# Patient Record
Sex: MALE | Race: WHITE | NOT HISPANIC OR LATINO | Employment: OTHER | URBAN - METROPOLITAN AREA
[De-identification: names, ages, dates, MRNs, and addresses within clinical notes are randomized per-mention and may not be internally consistent; named-entity substitution may affect disease eponyms.]

---

## 2023-10-03 DIAGNOSIS — K21.9 LARYNGOPHARYNGEAL REFLUX (LPR): Primary | ICD-10-CM

## 2023-10-03 RX ORDER — FAMOTIDINE 20 MG/1
20 TABLET, FILM COATED ORAL NIGHTLY
Qty: 30 TABLET | Refills: 2 | Status: SHIPPED | OUTPATIENT
Start: 2023-10-03 | End: 2023-12-06

## 2023-10-18 PROBLEM — D23.30 OTHER BENIGN NEOPLASM OF SKIN OF UNSPECIFIED PART OF FACE: Status: ACTIVE | Noted: 2021-12-14

## 2023-10-18 PROBLEM — R22.0 LOCALIZED SWELLING, MASS AND LUMP, HEAD: Status: ACTIVE | Noted: 2023-10-18

## 2023-10-18 PROBLEM — D22.39 MELANOCYTIC NEVI OF OTHER PARTS OF FACE: Status: ACTIVE | Noted: 2021-12-14

## 2023-10-18 PROBLEM — L82.0 INFLAMED SEBORRHEIC KERATOSIS: Status: ACTIVE | Noted: 2021-12-14

## 2023-10-18 PROBLEM — R68.89 EXCESSIVE ORAL SECRETIONS: Status: ACTIVE | Noted: 2023-10-18

## 2023-10-18 PROBLEM — M17.11 ARTHRITIS OF KNEE, RIGHT: Status: ACTIVE | Noted: 2023-10-18

## 2023-10-18 PROBLEM — C44.319 BASAL CELL CARCINOMA OF SKIN OF OTHER PARTS OF FACE: Status: ACTIVE | Noted: 2021-12-14

## 2023-10-18 PROBLEM — R42 VERTIGO: Status: ACTIVE | Noted: 2023-10-18

## 2023-10-18 PROBLEM — K21.9 GERD (GASTROESOPHAGEAL REFLUX DISEASE): Status: ACTIVE | Noted: 2023-10-18

## 2023-10-18 PROBLEM — L82.1 OTHER SEBORRHEIC KERATOSIS: Status: ACTIVE | Noted: 2021-12-14

## 2023-10-18 RX ORDER — ASCORBIC ACID 500 MG
TABLET ORAL
COMMUNITY

## 2023-10-18 RX ORDER — FLUTICASONE PROPIONATE AND SALMETEROL 113; 14 UG/1; UG/1
1 POWDER, METERED RESPIRATORY (INHALATION) 2 TIMES DAILY
COMMUNITY
Start: 2023-08-25

## 2023-10-18 RX ORDER — PANTOPRAZOLE SODIUM 40 MG/1
1 TABLET, DELAYED RELEASE ORAL DAILY
COMMUNITY
Start: 2022-08-19

## 2023-10-18 RX ORDER — CLOPIDOGREL BISULFATE 75 MG/1
75 TABLET ORAL DAILY
COMMUNITY

## 2023-10-18 RX ORDER — ACETAMINOPHEN 500 MG
TABLET ORAL
COMMUNITY

## 2023-10-18 RX ORDER — MAG CARB/ALUMINUM HYDROX/ALGIN 237.5-254
10 SUSPENSION, ORAL (FINAL DOSE FORM) ORAL NIGHTLY
COMMUNITY
Start: 2022-07-05

## 2023-10-18 RX ORDER — ROSUVASTATIN CALCIUM 20 MG/1
20 TABLET, COATED ORAL DAILY
COMMUNITY

## 2023-10-18 RX ORDER — EZETIMIBE 10 MG/1
10 TABLET ORAL DAILY
COMMUNITY

## 2023-10-18 RX ORDER — OMEPRAZOLE 20 MG/1
20 CAPSULE, DELAYED RELEASE ORAL
COMMUNITY
Start: 2023-09-07

## 2023-10-18 RX ORDER — KETOROLAC TROMETHAMINE 5 MG/ML
1 SOLUTION OPHTHALMIC 2 TIMES DAILY
COMMUNITY
Start: 2022-12-06

## 2023-10-18 RX ORDER — AMLODIPINE BESYLATE 2.5 MG/1
2.5 TABLET ORAL DAILY
COMMUNITY

## 2023-10-18 RX ORDER — HYDROCORTISONE ACETATE, IODOQUINOL 19; 10 MG/G; MG/G
CREAM TOPICAL 2 TIMES DAILY
COMMUNITY
Start: 2021-12-15

## 2023-10-18 RX ORDER — PREDNISOLONE ACETATE 10 MG/ML
1 SUSPENSION/ DROPS OPHTHALMIC 2 TIMES DAILY
COMMUNITY
Start: 2022-12-06

## 2023-10-18 RX ORDER — COLCHICINE 0.6 MG/1
0.6 TABLET ORAL
COMMUNITY
Start: 2021-08-13

## 2023-10-18 RX ORDER — ROSUVASTATIN CALCIUM 40 MG/1
40 TABLET, COATED ORAL
COMMUNITY
Start: 2023-08-10

## 2023-10-19 ENCOUNTER — OFFICE VISIT (OUTPATIENT)
Dept: OTOLARYNGOLOGY | Facility: CLINIC | Age: 78
End: 2023-10-19
Payer: MEDICARE

## 2023-10-19 VITALS — WEIGHT: 178 LBS | BODY MASS INDEX: 26.29 KG/M2

## 2023-10-19 DIAGNOSIS — H69.92 DYSFUNCTION OF LEFT EUSTACHIAN TUBE: Primary | ICD-10-CM

## 2023-10-19 PROBLEM — H90.A32 MIXED CONDUCTIVE AND SENSORINEURAL HEARING LOSS OF LEFT EAR WITH RESTRICTED HEARING OF RIGHT EAR: Status: ACTIVE | Noted: 2023-10-19

## 2023-10-19 PROCEDURE — 1159F MED LIST DOCD IN RCRD: CPT | Performed by: GENERAL PRACTICE

## 2023-10-19 PROCEDURE — 99213 OFFICE O/P EST LOW 20 MIN: CPT | Performed by: GENERAL PRACTICE

## 2023-10-19 PROCEDURE — 1036F TOBACCO NON-USER: CPT | Performed by: GENERAL PRACTICE

## 2023-10-19 ASSESSMENT — PATIENT HEALTH QUESTIONNAIRE - PHQ9
2. FEELING DOWN, DEPRESSED OR HOPELESS: NOT AT ALL
1. LITTLE INTEREST OR PLEASURE IN DOING THINGS: NOT AT ALL
SUM OF ALL RESPONSES TO PHQ9 QUESTIONS 1 AND 2: 0

## 2023-10-19 NOTE — PROGRESS NOTES
Otolaryngology - Head and Neck Surgery Outpatient New Patient Visit Note         History Of Present Illness  Coy Beltran is a 78 y.o. male presenting for evaluation of  concerns for ETD and reflux as well as left incudopexy on exam.      Audiogram completed which shows downsloping primarily SNHL with mild CHL on the left.  Ongoing negative middle ear pressure on the left.      Pt reports improvement in glbous, pharyngeal mucous and reflux symptoms with medical maangement.  No obvious change in ear symptoms.      No otalgia, otorrhea, dizziness.         Recall  78yoM presents for evaluation of recent ear discomfort/fullness and mild dizziness.      reports symptoms noted after flight. dizziness persisted for 3d and then essentially resolved. mild intermittent imbalance remains, noted with rising in posture.    ear fullness L>R has persisted. pt reports baseline hearing loss, slowly progressive over years.      The patient reports slow progression of the hearing loss over time.  The paitent reports a history of intermittent, waxing/waning, nonpulsatile, tonal tinnitus which does not interfere with hearing.   The patient denies a history of significant noise exposure due to occupational exposures, industrial noise, etc.   The patient denies sudden changes in hearing.  The patient denies otalgia, otorrhea, or facial weakness.   The patient denies a history of otologic surgery or trauma.  The patient denies a history of blast injury, TBI or concussion associated with hearing loss.  The patient denies a family history of significant hearing loss.  The patient reports a history of AOM as a child, but no recent significant history of ear infection.        the pt reports a prior history of vertigo diagnosed as BPPV and resolved with epley. no recent RS vertigo.      reports a history of GERDfor which he uses Tums frequent.ly.       Past Medical History  He has no past medical history on file.    Surgical History  He has  no past surgical history on file.     Social History  He reports that he has never smoked. He has never used smokeless tobacco. No history on file for alcohol use and drug use.    Family History  No family history on file.     Allergies  Meperidine    Review of Systems  ROS: Pertinent positives as noted in HPI.    - CONSTITUTIONAL: Does not report weight loss, fever or chills.    - HEENT:   Ear: Does not report  , vertigo,    , otalgia, otorrhea  Nose: Does not report congestion, rhinorrhea, epistaxis, decreased smell  Throat: Does not report pain, dysphagia, odynophagia  Larynx: Does not report hoarseness,  difficulty breathing, pain with speaking (odynophonia)  Neck: Does not report new masses, pain, swelling  Face: Does not report sinus pain, pressure, swelling, numbness, weakness     - RESPIRATORY: Does not report SOB or cough.    - CV: Does not report palpitations or chest pain.     - GI: Does not report abdominal pain, nausea, vomiting or diarrhea.    - : Does not report dysuria or urinary frequency.    - MSK: Does not report myalgia or joint pain.    - SKIN: Does not report rash or pruritus.    - NEUROLOGICAL: Does not report headache or syncope.    - PSYCHIATRIC: Does not report recent changes in mood. Does not report anxiety or depression.         Physical Exam:     GENERAL: Alert & Oriented to person, place and time; Normal affect and appearance. Well developed and well nourished. Conversant & cooperative with examination.      HEAD: Normocephalic, atraumatic. No sinus tenderness to palpation. Normal parotid bilaterally. Normal facial strength.      NEUROLOGIC: Cranial nerves II-XII grossly intact, gait WNL. Normal mood and affect.     EYES: Extraocular movements intact. Pupils equal, round, reactive to light and accommodation. No nystagmus, no ptosis. no scleral injection.     EAR: Normal auricle. No discomfort or TTP with manipulation. Otoscopic exam showed normal external auditory canals bilaterally.  No purulence or EAC inflammation.   Right tympanic membrane clear and mobile without evidence of perforation, retraction or middle ear effusion. Left tympanic membrane clear with posterior/superior dimeric TM with local retraction and incudopexy without obvious significant erosion. without evidence of perforation, or middle ear effusion.  Unable to mobilize with valsalva.      NOSE: No external deformity. No external nasal lesions, lacerations, or scars. Nasal tip symmetrical with normal nasal valves. Nasal cavity with essentially midline septum, normal mucosa and turbinates. No lesions, masses, purulence or polyps.      OC/OP: Mucous membranes moist, no masses, lesions or exudates. Normal tongue, floor of mouth, teeth, gums, lips. Normal posterior pharyngeal wall. Normal tonsils without erythema, exudate or obvious calculi      NECK: No neck masses or thyroid enlargement. Trachea midline. No tenderness to palpation     LYMPHATIC: No cervical lymphadenopathy.      RESPIRATORY: Symmetric chest elevation & no retractions. No significant hoarseness. No increased work of breathing.     CV: No clubbing or cyanosis. No obvious edema     Skin: no facial rashes, vesicles or lesions.      Extremities: no gross abnormalities    Clinic Procedure        Information review:  External sources (notes, imaging, lab results) listed below personally reviewed to aid in medical decision making process.  - MRI report from 8/9/23 shows some evidence of sinusitis but no soft tissue mass.      -  -      Assessment/Plan   Problem List Items Addressed This Visit    None  Visit Diagnoses         Codes    Dysfunction of left eustachian tube    -  Primary H69.92          B/l SNHL as well as mild left sided CHL with ETD and incudopexy.  No evidence of otitis or cholesteatoma.       Discussed use of hearing aid for hearing loss.  Discussed consideration of treatment for ETD, possible medical management and or interventions such as PE tube or ET  balloon dilation.  Pt prefers to observe for now.  RTC in 3mo or sooner for concerns.        Follow up:  -plan for follow up in clinic as needed and in 2-4 months    All of the above findings, impressions, treatment planning and follow up plans were discussed with the patient who indicated understanding.  the patient was instructed to contact or return to clinic sooner if symptoms/signs persist or worsen despite the above management.      Musa Saleh MD  Otolaryngology - Head and Neck Surgery

## 2023-12-05 DIAGNOSIS — K21.9 LARYNGOPHARYNGEAL REFLUX (LPR): ICD-10-CM

## 2023-12-06 RX ORDER — FAMOTIDINE 20 MG/1
20 TABLET, FILM COATED ORAL NIGHTLY
Qty: 90 TABLET | Refills: 0 | Status: SHIPPED | OUTPATIENT
Start: 2023-12-06 | End: 2024-03-11

## 2024-03-11 DIAGNOSIS — K21.9 LARYNGOPHARYNGEAL REFLUX (LPR): ICD-10-CM

## 2024-03-11 RX ORDER — FAMOTIDINE 20 MG/1
20 TABLET, FILM COATED ORAL NIGHTLY
Qty: 90 TABLET | Refills: 0 | Status: SHIPPED | OUTPATIENT
Start: 2024-03-11 | End: 2024-06-05

## 2024-04-15 ENCOUNTER — OFFICE VISIT (OUTPATIENT)
Dept: OTOLARYNGOLOGY | Facility: CLINIC | Age: 79
End: 2024-04-15
Payer: MEDICARE

## 2024-04-15 VITALS — WEIGHT: 179 LBS | BODY MASS INDEX: 27.22 KG/M2

## 2024-04-15 DIAGNOSIS — H73.892 TYMPANIC MEMBRANE RETRACTION, LEFT: ICD-10-CM

## 2024-04-15 DIAGNOSIS — H90.A32 MIXED CONDUCTIVE AND SENSORINEURAL HEARING LOSS OF LEFT EAR WITH RESTRICTED HEARING OF RIGHT EAR: Primary | ICD-10-CM

## 2024-04-15 PROCEDURE — 99213 OFFICE O/P EST LOW 20 MIN: CPT | Performed by: GENERAL PRACTICE

## 2024-04-15 PROCEDURE — 1159F MED LIST DOCD IN RCRD: CPT | Performed by: GENERAL PRACTICE

## 2024-04-15 PROCEDURE — 1036F TOBACCO NON-USER: CPT | Performed by: GENERAL PRACTICE

## 2024-04-15 ASSESSMENT — PATIENT HEALTH QUESTIONNAIRE - PHQ9
1. LITTLE INTEREST OR PLEASURE IN DOING THINGS: NOT AT ALL
SUM OF ALL RESPONSES TO PHQ9 QUESTIONS 1 AND 2: 0
2. FEELING DOWN, DEPRESSED OR HOPELESS: NOT AT ALL

## 2024-04-15 NOTE — PROGRESS NOTES
Otolaryngology - Head and Neck Surgery Outpatient New Patient Visit Note        Assessment/Plan   Problem List Items Addressed This Visit             ICD-10-CM       ENT    Mixed conductive and sensorineural hearing loss of left ear with restricted hearing of right ear - Primary H90.A32     Other Visit Diagnoses         Codes    Tympanic membrane retraction, left     H73.892          Stable incudopexy on the left, able to clear ears with valsalva b/l, without otitis on exam.   Continue current management for rhinitis and reflux to prevent ETD PRN.  Follow up for surveillance q6mo or sooner for concerns.           Follow up:  -plan for follow up in clinic as needed and in approximately 6 months    All of the above findings, impressions, treatment planning and follow up plans were discussed with the patient who indicated understanding.  the patient was instructed to contact or return to clinic sooner if symptoms/signs persist or worsen despite the above management.      Musa Saleh MD  Otolaryngology - Head and Neck Surgery            History Of Present Illness  Coy Beltran is a 78 y.o. male presenting for follow up of ETD and rhinitis  No recent bothersome symptoms.   No otalgia, otorrhea.  Hearing remains baseline.       Recall    Coy Beltran is a 78 y.o. male presenting for evaluation of  concerns for ETD and reflux as well as left incudopexy on exam.       Audiogram completed which shows downsloping primarily SNHL with mild CHL on the left.  Ongoing negative middle ear pressure on the left.       Pt reports improvement in glbous, pharyngeal mucous and reflux symptoms with medical maangement.  No obvious change in ear symptoms.       No otalgia, otorrhea, dizziness.            Recall  78yoM presents for evaluation of recent ear discomfort/fullness and mild dizziness.      reports symptoms noted after flight. dizziness persisted for 3d and then essentially resolved. mild intermittent imbalance remains, noted  with rising in posture.    ear fullness L>R has persisted. pt reports baseline hearing loss, slowly progressive over years.      The patient reports slow progression of the hearing loss over time.  The paitent reports a history of intermittent, waxing/waning, nonpulsatile, tonal tinnitus which does not interfere with hearing.   The patient denies a history of significant noise exposure due to occupational exposures, industrial noise, etc.   The patient denies sudden changes in hearing.  The patient denies otalgia, otorrhea, or facial weakness.   The patient denies a history of otologic surgery or trauma.  The patient denies a history of blast injury, TBI or concussion associated with hearing loss.  The patient denies a family history of significant hearing loss.  The patient reports a history of AOM as a child, but no recent significant history of ear infection.        the pt reports a prior history of vertigo diagnosed as BPPV and resolved with epley. no recent RS vertigo.      reports a history of GERDfor which he uses Tums frequent.ly.             Past Medical History  He has no past medical history on file.    Surgical History  He has no past surgical history on file.     Social History  He reports that he has never smoked. He has never used smokeless tobacco. No history on file for alcohol use and drug use.    Family History  No family history on file.     Allergies  Meperidine    Review of Systems  ROS: Pertinent positives as noted in HPI.    - CONSTITUTIONAL: Does not report weight loss, fever or chills.    - HEENT:   Ear: Does not report  , vertigo,    , otalgia, otorrhea  Nose: Does not report  , rhinorrhea, epistaxis, decreased smell  Throat: Does not report pain, dysphagia, odynophagia  Larynx: Does not report hoarseness,  difficulty breathing, pain with speaking (odynophonia)  Neck: Does not report new masses, pain, swelling  Face: Does not report sinus pain, pressure, swelling, numbness, weakness     -  RESPIRATORY: Does not report SOB or cough.    - CV: Does not report palpitations or chest pain.     - GI: Does not report abdominal pain, nausea, vomiting or diarrhea.    - : Does not report dysuria or urinary frequency.    - MSK: Does not report myalgia or joint pain.    - SKIN: Does not report rash or pruritus.    - NEUROLOGICAL: Does not report headache or syncope.    - PSYCHIATRIC: Does not report recent changes in mood. Does not report anxiety or depression.         Physical Exam:     GENERAL:   Alert & Oriented to person, place and time; Normal affect and appearance. Well developed and well nourished. Conversant & cooperative with examination.     HEAD:   Normocephalic, atraumatic. No sinus tenderness to palpation. Normal parotid bilaterally. Normal facial strength.     NEUROLOGIC:   Cranial nerves II-XII grossly intact, gait WNL. Normal mood and affect.    EYES:   Extraocular movements intact. Pupils equal, round, reactive to light and accommodation. No nystagmus, no ptosis. no scleral injection.      EAR: Normal auricle. No discomfort or TTP with manipulation. Otoscopic exam showed normal external auditory canals bilaterally. No purulence or EAC inflammation.   Right tympanic membrane clear and mobile without evidence of perforation, retraction or middle ear effusion. Left tympanic membrane clear with posterior/superior dimeric TM with local retraction and incudopexy without obvious significant erosion. without evidence of perforation, or middle ear effusion.  Unable to mobilize with valsalva.  But feels ear pressure change b/l with valsalva.     NOSE:   No external deformity. No external nasal lesions, lacerations, or scars. Nasal tip symmetrical with normal nasal valves.   Nasal cavity with essentially midline septum, normal mucosa and turbinates. No lesions, masses, purulence or polyps.     OC/OP:   Mucous membranes moist, no masses, lesions or exudates.   Normal tongue, floor of mouth, teeth, gums,  lips. Normal posterior pharyngeal wall.    Normal tonsils without erythema, exudate or obvious calculi     NECK:   No neck masses or thyroid enlargement. Trachea midline. No tenderness to palpation    LYMPHATIC:   No cervical lymphadenopathy.     RESPIRATORY:   Symmetric chest elevation & no retractions. No significant hoarseness. No increased work of breathing.    CV:   No clubbing or cyanosis. No obvious edema    Skin:   No facial rashes, vesicles or lesions.     Extremities:   No gross abnormalities      Clinic Procedure        Information review:  External sources (notes, imaging, lab results) listed below personally reviewed to aid in medical decision making process.  -  -  -

## 2024-06-05 DIAGNOSIS — K21.9 LARYNGOPHARYNGEAL REFLUX (LPR): ICD-10-CM

## 2024-06-05 RX ORDER — FAMOTIDINE 20 MG/1
20 TABLET, FILM COATED ORAL NIGHTLY
Qty: 90 TABLET | Refills: 0 | Status: SHIPPED | OUTPATIENT
Start: 2024-06-05

## 2024-06-26 ENCOUNTER — OFFICE VISIT (OUTPATIENT)
Dept: DERMATOLOGY | Facility: CLINIC | Age: 79
End: 2024-06-26
Payer: MEDICARE

## 2024-07-01 ENCOUNTER — APPOINTMENT (OUTPATIENT)
Dept: DERMATOLOGY | Facility: CLINIC | Age: 79
End: 2024-07-01
Payer: MEDICARE

## 2024-07-22 ENCOUNTER — APPOINTMENT (OUTPATIENT)
Dept: DERMATOLOGY | Facility: CLINIC | Age: 79
End: 2024-07-22
Payer: MEDICARE

## 2024-08-31 DIAGNOSIS — K21.9 LARYNGOPHARYNGEAL REFLUX (LPR): ICD-10-CM

## 2024-09-03 RX ORDER — FAMOTIDINE 20 MG/1
20 TABLET, FILM COATED ORAL NIGHTLY
Qty: 90 TABLET | Refills: 0 | Status: SHIPPED | OUTPATIENT
Start: 2024-09-03

## 2024-10-15 ENCOUNTER — OFFICE VISIT (OUTPATIENT)
Dept: GASTROENTEROLOGY | Facility: HOSPITAL | Age: 79
End: 2024-10-15
Payer: MEDICARE

## 2024-10-15 VITALS — BODY MASS INDEX: 27.13 KG/M2 | HEIGHT: 68 IN | WEIGHT: 179 LBS

## 2024-10-15 DIAGNOSIS — K57.92 DIVERTICULITIS: Primary | ICD-10-CM

## 2024-10-15 DIAGNOSIS — K59.09 OTHER CONSTIPATION: ICD-10-CM

## 2024-10-15 DIAGNOSIS — K21.9 GASTROESOPHAGEAL REFLUX DISEASE, UNSPECIFIED WHETHER ESOPHAGITIS PRESENT: ICD-10-CM

## 2024-10-15 PROCEDURE — 99213 OFFICE O/P EST LOW 20 MIN: CPT

## 2024-10-15 PROCEDURE — 99203 OFFICE O/P NEW LOW 30 MIN: CPT

## 2024-10-15 PROCEDURE — 1159F MED LIST DOCD IN RCRD: CPT

## 2024-10-15 ASSESSMENT — ENCOUNTER SYMPTOMS
RECTAL PAIN: 0
ABDOMINAL DISTENTION: 0
COUGH: 0
FEVER: 0
ANAL BLEEDING: 0
TROUBLE SWALLOWING: 0
DIARRHEA: 0
CHILLS: 0
CONSTIPATION: 0
NAUSEA: 0
BLOOD IN STOOL: 0
FATIGUE: 0
VOMITING: 0
SHORTNESS OF BREATH: 0
ABDOMINAL PAIN: 0
APPETITE CHANGE: 0

## 2024-10-15 NOTE — PATIENT INSTRUCTIONS
I recommend taking 1/2 to 1 capful of Miralax daily in 8 oz of liquid.  This is to help move bowels and soften stool.  Continue daily fiber supplement  Contact me if symptoms reoccur  Continue pepcid as needed

## 2024-10-15 NOTE — ASSESSMENT & PLAN NOTE
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

## 2024-10-15 NOTE — PROGRESS NOTES
Subjective     History of Present Illness:   Coy Beltran is a 79 y.o. male with PMHx of aortic dilation, TIA on plavix, GERD, prostate cancer, HTN who presents to GI clinic for further evaluation of low abdominal pain.    Today, patient states in early September he has sharp pain radiating across low abdomen, worse on left side.  Went to [place].  CT scan showed mild descending diverticulitis.  Was treated with antibiotics. Has normal to soft stools.  Was ok for 2 weeks, but pain returned and in the past 3 days pain resolved. Never modified diet during this time. Takes daily fiber supplement. Normal moves bowels daily.  States stricture in bowel was investigated and due to scar tissue r/t hx prostate cancer.  Follows antireflux regimen.  Heartburn is a few times weekly.  Takes as needed 20 mg pepcid.  Prefers to manage without medication.  Denies constipation, diarrhea, dyspepsia, melena, hematochezia, dysphagia, unintentional weight loss    ETOH 1-2 drinks daily, denies smoking or marijuana  Denies fxh GI cancer or IBD  Abdominal Surgeries: denies    Last colonoscopy 2/2022 [facility]: diverticulosis, stricture sigmoid colon, 2 4 mm TA descending, 3 mm hyperplastic rectum  Last EGD 2017- gastritis and esophagitis      Past Medical History  As per HPI.     Social History  he  reports that he has never smoked. He has never used smokeless tobacco.     Family History  his family history is not on file.     Review of Systems  Review of Systems   Constitutional:  Negative for appetite change, chills, fatigue and fever.   HENT:  Negative for trouble swallowing.    Respiratory:  Negative for cough and shortness of breath.    Gastrointestinal:  Negative for abdominal distention, abdominal pain, anal bleeding, blood in stool, constipation, diarrhea, nausea, rectal pain and vomiting.       Allergies  Allergies   Allergen Reactions    Meperidine Unknown       Medications  Current Outpatient Medications   Medication Instructions     aluminum hydrox-magnesium carb (Gaviscon Extra Strength) 254-237.5 mg/5 mL suspension 10 mL, oral, Nightly    amLODIPine (NORVASC) 2.5 mg, oral, Daily    ascorbic acid (Vitamin C) 500 mg tablet oral    cholecalciferol (Vitamin D3) 50 mcg (2,000 unit) capsule oral    clopidogrel (PLAVIX) 75 mg, oral, Daily    colchicine 0.6 mg, oral    ezetimibe (ZETIA) 10 mg, oral, Daily    famotidine (PEPCID) 20 mg, oral, Nightly    fluticasone propion-salmeteroL (AirDuo RespiClick) 113-14 mcg/actuation inhaler 1 puff, inhalation, 2 times daily    hydrocortisone-iodoquinoL-aloe 1.9-1 % cream in packet 2 times daily,  apply to affected area twice daily as directed    ketorolac (Acular) 0.5 % ophthalmic solution 1 drop, Right Eye, 2 times daily, As directed    omeprazole (PRILOSEC) 20 mg, oral, TAKE 1 CAPSULE 30 MINUTES BEFORE AM/PM MEAL    pantoprazole (ProtoNix) 40 mg EC tablet 1 tablet, oral, Daily    prednisoLONE acetate (Pred-Forte) 1 % ophthalmic suspension 1 drop, Right Eye, 2 times daily    rosuvastatin (CRESTOR) 20 mg, oral, Daily    rosuvastatin (CRESTOR) 40 mg        Objective   There were no vitals taken for this visit.   Physical Exam  Constitutional:       Appearance: Normal appearance. He is normal weight.   HENT:      Mouth/Throat:      Mouth: Mucous membranes are dry.      Pharynx: Oropharynx is clear.   Cardiovascular:      Rate and Rhythm: Normal rate and regular rhythm.   Pulmonary:      Effort: Pulmonary effort is normal.      Breath sounds: Normal breath sounds. No wheezing or rhonchi.   Abdominal:      General: Abdomen is flat. Bowel sounds are normal. There is no distension.      Palpations: Abdomen is soft. There is no hepatomegaly.      Tenderness: There is no abdominal tenderness. There is no guarding or rebound. Negative signs include Bourgeois's sign.      Hernia: No hernia is present.   Musculoskeletal:         General: Normal range of motion.   Skin:     General: Skin is warm and dry.   Neurological:     "  General: No focal deficit present.      Mental Status: He is alert and oriented to person, place, and time.   Psychiatric:         Mood and Affect: Mood normal.         Behavior: Behavior normal.           No results found for: \"WBC\", \"HGB\", \"HCT\", \"PLT\"  No results found for: \"NA\", \"K\", \"CL\", \"CO2\", \"BUN\", \"CREATININE\", \"CALCIUM\", \"PROT\", \"BILITOT\", \"ALKPHOS\", \"ALT\", \"AST\", \"GLUCOSE\"        Coy Beltran is a 79 y.o. male who presents to GI clinic for diverticulitis.    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

## 2024-10-15 NOTE — ASSESSMENT & PLAN NOTE
First episode of mild diverticulitis, which clinically resolved following treatment  - patient prefers to avoid colonoscopy  - recommended patient contact me if symptoms reoccur

## 2025-02-03 ENCOUNTER — APPOINTMENT (OUTPATIENT)
Dept: OTOLARYNGOLOGY | Facility: CLINIC | Age: 80
End: 2025-02-03
Payer: MEDICARE

## 2025-02-03 VITALS — BODY MASS INDEX: 27.37 KG/M2 | WEIGHT: 180 LBS

## 2025-02-03 DIAGNOSIS — H61.23 EXCESSIVE CERUMEN IN BOTH EAR CANALS: ICD-10-CM

## 2025-02-03 DIAGNOSIS — K21.9 LARYNGOPHARYNGEAL REFLUX (LPR): ICD-10-CM

## 2025-02-03 DIAGNOSIS — R05.3 CHRONIC COUGH: Primary | ICD-10-CM

## 2025-02-03 DIAGNOSIS — H93.19 TINNITUS, UNSPECIFIED LATERALITY: ICD-10-CM

## 2025-02-03 PROCEDURE — 31231 NASAL ENDOSCOPY DX: CPT | Performed by: GENERAL PRACTICE

## 2025-02-03 PROCEDURE — 69210 REMOVE IMPACTED EAR WAX UNI: CPT | Performed by: GENERAL PRACTICE

## 2025-02-03 PROCEDURE — 1036F TOBACCO NON-USER: CPT | Performed by: GENERAL PRACTICE

## 2025-02-03 PROCEDURE — 99214 OFFICE O/P EST MOD 30 MIN: CPT | Performed by: GENERAL PRACTICE

## 2025-02-03 PROCEDURE — 1159F MED LIST DOCD IN RCRD: CPT | Performed by: GENERAL PRACTICE

## 2025-02-03 PROCEDURE — 31575 DIAGNOSTIC LARYNGOSCOPY: CPT | Performed by: GENERAL PRACTICE

## 2025-02-03 RX ORDER — MAGNESIUM CARB/ALUMINUM HYDROX 105-160MG
1 TABLET,CHEWABLE ORAL 3 TIMES DAILY
Qty: 90 TABLET | Refills: 3 | Status: SHIPPED | OUTPATIENT
Start: 2025-02-03

## 2025-02-04 NOTE — PROGRESS NOTES
Otolaryngology - Head and Neck Surgery Outpatient New Patient Visit Note        Assessment/Plan:   Problem List Items Addressed This Visit    None  Visit Diagnoses         Codes    Chronic cough    -  Primary R05.3    Tinnitus, unspecified laterality     H93.19    Relevant Orders    Referral to Audiology    Laryngopharyngeal reflux (LPR)     K21.9    Relevant Medications    aluminum hydrox-magnesium carb (Gaviscon Extra Strength) 160-105 mg tablet,chewable    Excessive cerumen in both ear canals     H61.23              79yoM with symptoms of LPR, with some LPR changes on NP Scope but no other laryngeal pathology.        Discussed conservative management of reflux, and risks of long term anti-reflux medications.  Discussed avoidance of triggers, dietary and behavioral management strategies for reflux.  Discussed possible referral to GI for further testing and evaluation.  Will trial   medical therapy (combination gaviscon and H2 blockers) for 1-2 months and assess for symptom response.  Plan for taper of medical management to least possible to control symptoms, in favor of using dietary/behavioral controls.       B/l cerumen impaction, debrided.   Reports recent increase in tinnitus, will acquire audio to aid in eval.  Discussed conservative tinnitus management.    Consider use of tinnitus management options, which include but are not limited to the following: sound therapy (use of pleasant or calming sounds that diminish the presence of tinnitus); mindfulness, meditation, and breathing exercises; sleep hygiene; mental health evaluation and formal therapies; psychiatric management of psychopharmaceuticals; dental/orthodontia evaluation; dietary changes (reduction of sodium, caffeine, alcohol); lifestyle changes (exercise, etc.); use of hearing protection and avoidance of loud noise; and formal tinnitus therapies (Tinnitus Retraining Therapy/ TRT, Progressive Tinnitus Management, Tinnitus Activities Treatment,  Cognitive Behavioral Therapy); and Biomedical Neuromodulation (Lenire).      Follow up:  -plan for follow up in clinic as needed, after completion of ordered studies, and in 1-2 months    All of the above findings, impressions, treatment planning and follow up plans were discussed with the patient who indicated understanding.  the patient was instructed to contact or return to clinic sooner if symptoms/signs persist or worsen despite the above management.      Musa Saleh MD  Otolaryngology - Head and Neck Surgery            History Of Present Illness  Coy Beltran is a 79 y.o. male presenting for bothersome globus, pharyngeal mucous and throat clearing/cough.  Reports symptoms present over months without inciting illness/trauma  Notes no significant ongoing rhinitis or sinusitis.   Notes a history of GERD, treated with famotidine and tums. Can't take PPI due to medicine interactions.     Reports recent increase in muffled hearing and tinnitus.  No otalgia.   Has hearing aids but typically does not wear.     Recall    Coy Beltran is a 78 y.o. male presenting for follow up of ETD and rhinitis  No recent bothersome symptoms.   No otalgia, otorrhea.  Hearing remains baseline.         Recall     Coy Beltran is a 78 y.o. male presenting for evaluation of  concerns for ETD and reflux as well as left incudopexy on exam.       Audiogram completed which shows downsloping primarily SNHL with mild CHL on the left.  Ongoing negative middle ear pressure on the left.       Pt reports improvement in glbous, pharyngeal mucous and reflux symptoms with medical maangement.  No obvious change in ear symptoms.       No otalgia, otorrhea, dizziness.            Recall  78yoM presents for evaluation of recent ear discomfort/fullness and mild dizziness.      reports symptoms noted after flight. dizziness persisted for 3d and then essentially resolved. mild intermittent imbalance remains, noted with rising in posture.    ear fullness  L>R has persisted. pt reports baseline hearing loss, slowly progressive over years.      The patient reports slow progression of the hearing loss over time.  The paitent reports a history of intermittent, waxing/waning, nonpulsatile, tonal tinnitus which does not interfere with hearing.   The patient denies a history of significant noise exposure due to occupational exposures, industrial noise, etc.   The patient denies sudden changes in hearing.  The patient denies otalgia, otorrhea, or facial weakness.   The patient denies a history of otologic surgery or trauma.  The patient denies a history of blast injury, TBI or concussion associated with hearing loss.  The patient denies a family history of significant hearing loss.  The patient reports a history of AOM as a child, but no recent significant history of ear infection.        the pt reports a prior history of vertigo diagnosed as BPPV and resolved with epley. no recent RS vertigo.      reports a history of GERDfor which he uses Tums frequent.ly.             Past Medical History  He has no past medical history on file.    Surgical History  He has no past surgical history on file.     Social History  He reports that he has never smoked. He has never used smokeless tobacco. No history on file for alcohol use and drug use.    Family History  No family history on file.     Allergies  Meperidine    Review of Systems  ROS: Pertinent positives as noted in HPI.    - CONSTITUTIONAL: Does not report weight loss, fever or chills.    - HEENT:   Ear: Does not report  , vertigo,    , otalgia, otorrhea  Nose: Does not report congestion, rhinorrhea, epistaxis, decreased smell  Throat: Does not report pain, dysphagia, odynophagia  Larynx: Does not report hoarseness,  difficulty breathing, pain with speaking (odynophonia)  Neck: Does not report new masses, pain, swelling  Face: Does not report sinus pain, pressure, swelling, numbness, weakness     - RESPIRATORY: Does not report  SOB or  .    - CV: Does not report palpitations or chest pain.     - GI: Does not report abdominal pain, nausea, vomiting or diarrhea.    - : Does not report dysuria or urinary frequency.    - MSK: Does not report myalgia or joint pain.    - SKIN: Does not report rash or pruritus.    - NEUROLOGICAL: Does not report headache or syncope.    - PSYCHIATRIC: Does not report recent changes in mood. Does not report anxiety or depression.         Physical Exam:     GENERAL:   Alert & Oriented to person, place and time; Normal affect and appearance. Well developed and well nourished. Conversant & cooperative with examination.     HEAD:   Normocephalic, atraumatic. No sinus tenderness to palpation. Normal parotid bilaterally. Normal facial strength.     NEUROLOGIC:   Cranial nerves II-XII grossly intact, gait WNL. Normal mood and affect.    EYES:   Extraocular movements intact. Pupils equal, round, reactive to light and accommodation. No nystagmus, no ptosis. no scleral injection.    EAR:   Normal auricle. No discomfort or TTP with manipulation.   Handheld otoscopic exam showed normal external auditory canals bilaterally. No purulence or EAC inflammation. Impacted cerumen b/l debrided with suction.   Right tympanic membrane clear and mobile without evidence of perforation, retraction or middle ear effusion.   Left tympanic membrane clear and mobile without evidence of perforation, retraction or middle ear effusion.     NOSE:   No external deformity. No external nasal lesions, lacerations, or scars. Nasal tip symmetrical with normal nasal valves.   Nasal cavity with essentially midline septum, normal mucosa and turbinates. No lesions, masses, purulence or polyps.     OC/OP:   Mucous membranes moist, no masses, lesions or exudates.   Normal tongue, floor of mouth, teeth, gums, lips. Normal posterior pharyngeal wall.    Normal tonsils without erythema, exudate or obvious calculi     NECK:   No neck masses or thyroid  enlargement. Trachea midline. No tenderness to palpation    LYMPHATIC:   No cervical lymphadenopathy.     RESPIRATORY:   Symmetric chest elevation & no retractions. No significant hoarseness. No increased work of breathing.    CV:   No clubbing or cyanosis. No obvious edema    Skin:   No facial rashes, vesicles or lesions.     Extremities:   No gross abnormalities      Clinic Procedure    Nasal Endoscopy Laryngoscopy Nasophrayngosocopy   Procedure: flexible fiberoptic laryngoscopy, nasopharyngoscopy.   Flexible Fiberoptic Laryngoscopy Indication:   inability to tolerate mirror exam     Risks, benefits, and alternatives, infection risk, bleeding risk and risk of mucosal trauma and pain were discussed with the patient. Verbal consent was obtained prior to the procedure and is detailed in the patient's record.     Procedure Note:      With the patient seated in the exam chair, the bilateral nasal cavity was topically treated with 4% lidocaine and phenylephrine.  The bilateral nasal cavity was intubated with the flexible laryngoscope.   Nasal Endoscopy: Nasal endoscopy was successfully completed using the endoscope and the nasal mucosa, septum, turbinates, and osteomeatal complex were examined.  Nasopharyngoscopy: Nasopharyngoscopy was successfully completed using the endoscope and the nasal mucosa, septum, turbinates, osteomeatal complex, and nasopharynx were examined.   Laryngoscopy: Laryngoscopy was successfully completed using the flexible laryngoscope and the nasopharynx, hypopharynx, and larynx were examined.  Patient Status: the patient tolerated the procedure well.   Complications: there were no complications.      . After obtaining adequate decongestion and anesthesia, the scope was introduced into the floor of the nasal cavity. The septum, inferior, and middle turbinates were without any mucosal lesions.  The [place] of [place] were clear bilaterally, and good velopharyngeal closure was obtained on  phonation.  Base of tongue, tonsillar complex, and posterior pharyngeal wall free of asymmetry or concerning ulcerations or masses.  supraglottic segments with edema, pachydermia and erythema at the esophageal opening and posterior supraglottis.  scattered mucous debris.  No mucosal ulcerations or masses.  True vocal cords abduct and adduct normally with no mucosal or mass lesions.  No masses visualized in the pyriform recesses.  The scope was removed and patient tolerated the procedure well.      Information review:  External sources (notes, imaging, lab results) listed below personally reviewed to aid in medical decision making process.  -  -  -

## 2025-02-10 ENCOUNTER — CLINICAL SUPPORT (OUTPATIENT)
Dept: AUDIOLOGY | Facility: CLINIC | Age: 80
End: 2025-02-10
Payer: MEDICARE

## 2025-02-10 DIAGNOSIS — H90.3 BILATERAL SENSORINEURAL HEARING LOSS: ICD-10-CM

## 2025-02-10 DIAGNOSIS — H93.13 TINNITUS OF BOTH EARS: Primary | ICD-10-CM

## 2025-02-10 PROCEDURE — 92550 TYMPANOMETRY & REFLEX THRESH: CPT | Mod: 52

## 2025-02-10 PROCEDURE — 92557 COMPREHENSIVE HEARING TEST: CPT

## 2025-02-10 ASSESSMENT — PAIN SCALES - GENERAL: PAINLEVEL_OUTOF10: 0 - NO PAIN

## 2025-02-10 ASSESSMENT — PAIN - FUNCTIONAL ASSESSMENT: PAIN_FUNCTIONAL_ASSESSMENT: 0-10

## 2025-02-10 NOTE — PROGRESS NOTES
AUDIOLOGIC EVALUATION      Name:  Coy Beltran  :  1945  Age:  79 y.o.  Date of Evaluation:  2/10/2025    Time: 830-930    HISTORY:  Coy Beltran, 79 y.o., was seen for an audiologic assessment at the request of Dr. Musa Saleh. He reported constant bilateral ringing tinnitus that began a few weeks ago. He noted he finds it bothersome and keeps him from falling asleep/wakes him up. He has a history of bilateral sensorineural hearing loss. He wears Signia hearing aids that were purchased from [employer]. He noted limited use of his hearing aids. He has a history of vertigo that occurred two years ago and was treated at [facility]. He has right otalgia over the last 5-6 months. He has a history of excessive occupational noise exposure. He denied otorrhea, aural pressure/fullness, history of otologic surgeries, and recent falls.       RESULTS:  Otoscopic Evaluation:  Right Ear: Unremarkable  Left Ear: Unremarkable    Immittance Measures:  Right Ear: Type A -- indicating normal volume, pressure, and static compliance  Left Ear: Type C -- indicating negative pressure    Acoustic Reflexes:  Right Ear: (Ipsilateral) Responses absent at 500-4000 Hz.  Left Ear: (Ipsilateral) Responses absent at 500-4000 Hz.    Pure Tone Audiometry:    Right Ear: Hearing within normal limits 125-250 Hz sloping to a moderately-severe sensorineural hearing loss at 8000 Hz    Left Ear: Mild sensorineural hearing loss 125-250 Hz sloping to a severe sensorineural hearing loss at 8000 Hz    Asymmetry: No    In comparison to previous audio completed on 23, his hearing has remained stable in both ears.      Reliability:   Good    Speech Audiometry:   Right: Speech Reception Threshold (SRT) was obtained at 40 dBHL.   SRT/PTA in Good agreement with pure tone average.    Left: Speech Reception Threshold (SRT) was obtained at 45 dBHL.   SRT/PTA in Good agreement with pure tone average.    Word Recognition Scores  (WRS):  Right Ear: excellent (100%) in quiet when words were presented at 80 dBHL.   Left Ear: excellent (100%) in quiet when words were presented at 85 dBHL.     Asymmetry: No      IMPRESSIONS:  Today's testing revealed normal ear canal volume, middle ear pressure, and tympanic membrane compliance in the right ear. In the left ear he has negative middle ear pressure. He has hearing within normal limits 125-250 Hz sloping to a moderately-severe sensorineural hearing loss at 8000 Hz in the right ear. In the left ear he has a mild sensorineural hearing loss 125-250 Hz sloping to a severe sensorineural hearing loss at 8000 Hz. In comparison to previous audio completed on 7/8/23, his hearing has remained stable in both ears. He has excellent word recognition in both ears. The results were discussed with the patient. We reviewed common causes and treatment of tinnitus. I encouraged him to increase overall wear time of his devices. He has interest in the Lenire device. As we don't dispense Lenire at , he was encouraged to schedule an evaluation with [facility], who can be reached at [phone].       RECOMMENDATIONS:  1.) Continue medical follow up with Ears Nose and Throat (ENT) provider as recommended.  2.) Return for audiologic evaluation  annually  to monitor hearing sensitivity and assess middle ear status or sooner should concerns arise. The audiology department can be reached at [phone] to schedule an appointment.   3.) Strive for full-time device use during waking hours, except when activities preclude device safety.  4.) Consider use of tinnitus management options, which include but are not limited to the following: sound therapy (use of pleasant or calming sounds that diminish the presence of tinnitus); mindfulness, meditation, and breathing exercises; sleep hygiene; mental health evaluation and formal therapies; psychiatric management of psychopharmaceuticals; dental/orthodontia  evaluation; dietary changes (reduction of sodium, caffeine, alcohol); lifestyle changes (exercise, etc.); use of hearing protection and avoidance of loud noise; and formal tinnitus therapies (Tinnitus Retraining Therapy/ TRT, Progressive Tinnitus Management, Tinnitus Activities Treatment, Cognitive Behavioral Therapy).  5.) For tinnitus management, consider Lenire treatment device, available at [facility] with Shayla Enamorado, JERO-EFREN, KD, or other audiologist provider. [facility] can be reached at [phone] for scheduling. [address], [address], [place] [id number].      Shayla Ivan, CCC-A  Clinical Audiologist      KEY  SNHL Sensorineural Hearing Loss   CHL Conductive Hearing Loss   MHL Mixed Hearing Loss   SSNHL Sudden Sensorineural Hearing Loss   WNL Within Normal Limits   PTA Pure Tone Average   TM Tympanic Membrane   ECV Ear Canal Volume   SRT Speech Reception Threshold   WRS Word Recognition Score

## 2025-02-13 ENCOUNTER — TELEPHONE (OUTPATIENT)
Dept: OTOLARYNGOLOGY | Facility: CLINIC | Age: 80
End: 2025-02-13
Payer: MEDICARE

## 2025-07-21 ENCOUNTER — OFFICE VISIT (OUTPATIENT)
Dept: OTOLARYNGOLOGY | Facility: CLINIC | Age: 80
End: 2025-07-21
Payer: MEDICARE

## 2025-07-21 VITALS — WEIGHT: 181.9 LBS | BODY MASS INDEX: 27.66 KG/M2

## 2025-07-21 DIAGNOSIS — H93.19 TINNITUS, UNSPECIFIED LATERALITY: Primary | ICD-10-CM

## 2025-07-21 DIAGNOSIS — J31.0 CHRONIC RHINITIS: ICD-10-CM

## 2025-07-21 PROCEDURE — 99214 OFFICE O/P EST MOD 30 MIN: CPT | Performed by: GENERAL PRACTICE

## 2025-07-21 PROCEDURE — 1159F MED LIST DOCD IN RCRD: CPT | Performed by: GENERAL PRACTICE

## 2025-07-21 RX ORDER — TRIAMCINOLONE ACETONIDE 55 UG/1
2 SPRAY, METERED NASAL DAILY
Qty: 16.5 G | Refills: 3 | Status: SHIPPED | OUTPATIENT
Start: 2025-07-21 | End: 2026-07-21

## 2025-07-21 ASSESSMENT — PATIENT HEALTH QUESTIONNAIRE - PHQ9
1. LITTLE INTEREST OR PLEASURE IN DOING THINGS: NOT AT ALL
2. FEELING DOWN, DEPRESSED OR HOPELESS: NOT AT ALL
SUM OF ALL RESPONSES TO PHQ9 QUESTIONS 1 AND 2: 0

## 2025-07-21 NOTE — PROGRESS NOTES
Otolaryngology - Head and Neck Surgery Outpatient Established Patient Visit Note        Assessment/Plan:   Problem List Items Addressed This Visit    None  Visit Diagnoses         Codes      Tinnitus, unspecified laterality    -  Primary H93.19    Relevant Orders    Referral to Psychology      Chronic rhinitis     J31.0    Relevant Medications    triamcinolone (Nasacort) 55 mcg nasal inhaler            80yoM with ongoing bothersome tinnitus in the setting of high frequency SNHL b/l.     Mild ETD on audiogram, discussed ongoing controls for rhinitis/reflux to aid in ETD.     Discussed conservative management for tinnitus.     Consider use of tinnitus management options, which include but are not limited to the following: sound therapy (use of pleasant or calming sounds that diminish the presence of tinnitus); mindfulness, meditation, and breathing exercises; sleep hygiene; mental health evaluation and formal therapies; psychiatric management of psychopharmaceuticals; dental/orthodontia evaluation; dietary changes (reduction of sodium, caffeine, alcohol); lifestyle changes (exercise, etc.); use of hearing protection and avoidance of loud noise; and formal tinnitus therapies (Tinnitus Retraining Therapy/ TRT, Progressive Tinnitus Management, Tinnitus Activities Treatment, Cognitive Behavioral Therapy); and Biomedical Neuromodulation (Lenire).      Pt would like to consider CBT/biofeedback.        Follow up:  -plan for follow up in clinic as needed    All of the above findings, impressions, treatment planning and follow up plans were discussed with the patient who indicated understanding.  the patient was instructed to contact or return to clinic sooner if symptoms/signs persist or worsen despite the above management.      Musa Saleh MD  Otolaryngology - Head and Neck Surgery            History Of Present Illness  Coy Beltran is a 80 y.o. male presenting for ongoing tinnitus.    Reports ongoing waxing/waning high  tonal tinnitus.   Mild baseline hearing loss.     Audio completed 2/10/25 shows b/l high frquency SNHL.  Type C left, type A right tymps.      Notes mild ongoing globus and LPR symptoms  Mild congestion.      No otalgia or ear pain/pressure.      Reports trial of accupuncture without relief of tinnitus.        Recall  Coy Beltran is a 79 y.o. male presenting for bothersome globus, pharyngeal mucous and throat clearing/cough.  Reports symptoms present over months without inciting illness/trauma  Notes no significant ongoing rhinitis or sinusitis.   Notes a history of GERD, treated with famotidine and tums. Can't take PPI due to medicine interactions.      Reports recent increase in muffled hearing and tinnitus.  No otalgia.   Has hearing aids but typically does not wear.      Recall     Coy Beltran is a 78 y.o. male presenting for follow up of ETD and rhinitis  No recent bothersome symptoms.   No otalgia, otorrhea.  Hearing remains baseline.         Recall     Coy Beltran is a 78 y.o. male presenting for evaluation of  concerns for ETD and reflux as well as left incudopexy on exam.       Audiogram completed which shows downsloping primarily SNHL with mild CHL on the left.  Ongoing negative middle ear pressure on the left.       Pt reports improvement in glbous, pharyngeal mucous and reflux symptoms with medical maangement.  No obvious change in ear symptoms.       No otalgia, otorrhea, dizziness.            Recall  78yoM presents for evaluation of recent ear discomfort/fullness and mild dizziness.      reports symptoms noted after flight. dizziness persisted for 3d and then essentially resolved. mild intermittent imbalance remains, noted with rising in posture.    ear fullness L>R has persisted. pt reports baseline hearing loss, slowly progressive over years.      The patient reports slow progression of the hearing loss over time.  The paitent reports a history of intermittent, waxing/waning, nonpulsatile,  tonal tinnitus which does not interfere with hearing.   The patient denies a history of significant noise exposure due to occupational exposures, industrial noise, etc.   The patient denies sudden changes in hearing.  The patient denies otalgia, otorrhea, or facial weakness.   The patient denies a history of otologic surgery or trauma.  The patient denies a history of blast injury, TBI or concussion associated with hearing loss.  The patient denies a family history of significant hearing loss.  The patient reports a history of AOM as a child, but no recent significant history of ear infection.        the pt reports a prior history of vertigo diagnosed as BPPV and resolved with epley. no recent RS vertigo.      reports a history of GERDfor which he uses Tums frequent.ly.       Past Medical History  He has no past medical history on file.    Surgical History  He has no past surgical history on file.     Social History  He reports that he has never smoked. He has never used smokeless tobacco. No history on file for alcohol use and drug use.    Family History  Family History[1]     Allergies  Meperidine    Review of Systems  ROS: Pertinent positives as noted in HPI.    - CONSTITUTIONAL: Does not report weight loss, fever or chills.    - HEENT:   Ear: Does not report  , vertigo,    , otalgia, otorrhea  Nose: Does not report congestion, rhinorrhea, epistaxis, decreased smell  Throat: Does not report pain, dysphagia, odynophagia  Larynx: Does not report hoarseness,  difficulty breathing, pain with speaking (odynophonia)  Neck: Does not report new masses, pain, swelling  Face: Does not report sinus pain, pressure, swelling, numbness, weakness     - RESPIRATORY: Does not report SOB or cough.    - CV: Does not report palpitations or chest pain.     - GI: Does not report abdominal pain, nausea, vomiting or diarrhea.    - : Does not report dysuria or urinary frequency.    - MSK: Does not report myalgia or joint pain.    -  SKIN: Does not report rash or pruritus.    - NEUROLOGICAL: Does not report headache or syncope.    - PSYCHIATRIC: Does not report recent changes in mood. Does not report anxiety or depression.         Physical Exam:     GENERAL:   Alert & Oriented to person, place and time; Normal affect and appearance. Well developed and well nourished. Conversant & cooperative with examination.     HEAD:   Normocephalic, atraumatic. No sinus tenderness to palpation. Normal parotid bilaterally. Normal facial strength.     NEUROLOGIC:   Cranial nerves II-XII grossly intact, gait WNL. Normal mood and affect.    EYES:   Extraocular movements intact. Pupils equal, round, reactive to light and accommodation. No nystagmus, no ptosis. no scleral injection.    EAR:   Normal auricle. No discomfort or TTP with manipulation.   Handheld otoscopic exam showed normal external auditory canals bilaterally. No purulence or EAC inflammation. Minimal cerumen.   Right tympanic membrane clear and mobile without evidence of perforation, retraction or middle ear effusion.   Left tympanic membrane clear and mobile without evidence of perforation, retraction or middle ear effusion.     NOSE:   No external deformity. No external nasal lesions, lacerations, or scars. Nasal tip symmetrical with normal nasal valves.   Nasal cavity with essentially midline septum, edematous  mucosa and turbinates. No lesions, masses, purulence or polyps.     OC/OP:   Mucous membranes moist, no masses, lesions or exudates.   Normal tongue, floor of mouth, teeth, gums, lips. Normal posterior pharyngeal wall.    Normal tonsils without erythema, exudate or obvious calculi     NECK:   No neck masses or thyroid enlargement. Trachea midline. No tenderness to palpation    LYMPHATIC:   No cervical lymphadenopathy.     RESPIRATORY:   Symmetric chest elevation & no retractions. No significant hoarseness. No increased work of breathing.    CV:   No clubbing or cyanosis. No obvious  edema    Skin:   No facial rashes, vesicles or lesions.     Extremities:   No gross abnormalities      Clinic Procedure        Information review:  External sources (notes, imaging, lab results) listed below personally reviewed to aid in medical decision making process.  -  -  -         [1] No family history on file.